# Patient Record
Sex: FEMALE | Race: ASIAN | Employment: UNEMPLOYED | URBAN - METROPOLITAN AREA
[De-identification: names, ages, dates, MRNs, and addresses within clinical notes are randomized per-mention and may not be internally consistent; named-entity substitution may affect disease eponyms.]

---

## 2020-07-29 ENCOUNTER — HOSPICE ADMISSION (OUTPATIENT)
Dept: HOSPICE | Facility: HOSPICE | Age: 85
End: 2020-07-29

## 2020-07-29 ENCOUNTER — HOSPITAL ENCOUNTER (EMERGENCY)
Age: 85
Discharge: HOME | End: 2020-07-29
Attending: EMERGENCY MEDICINE

## 2020-07-29 VITALS
HEART RATE: 76 BPM | WEIGHT: 72 LBS | BODY MASS INDEX: 13.59 KG/M2 | RESPIRATION RATE: 16 BRPM | SYSTOLIC BLOOD PRESSURE: 166 MMHG | DIASTOLIC BLOOD PRESSURE: 50 MMHG | OXYGEN SATURATION: 94 % | HEIGHT: 61 IN

## 2020-07-29 DIAGNOSIS — R62.7 FAILURE TO THRIVE IN ADULT: Primary | ICD-10-CM

## 2020-07-29 DIAGNOSIS — L89.200: ICD-10-CM

## 2020-07-29 DIAGNOSIS — F03.91 DEMENTIA WITH BEHAVIORAL DISTURBANCE, UNSPECIFIED DEMENTIA TYPE: ICD-10-CM

## 2020-07-29 PROCEDURE — 99285 EMERGENCY DEPT VISIT HI MDM: CPT

## 2020-07-29 PROCEDURE — 76450000000

## 2020-07-29 RX ORDER — SERTRALINE HYDROCHLORIDE 25 MG/1
25 TABLET, FILM COATED ORAL DAILY
COMMUNITY
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
## 2020-07-29 NOTE — ED NOTES
Telephone call from Cecily Monzon RN care [facility]. She has informed that patient can be d/c home and patients  is to call teresa when he gets home provided patient  with phone number to call.  would like to take her home. he does not want transportation arranged for this patient
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
## 2020-07-29 NOTE — ED NOTES
Pt's  states that he wants to take her home instead of having transport take her. We advised that he may need help getting her in the house and he reports that he is able to do this.

## 2021-09-01 NOTE — DISCHARGE INSTRUCTIONS
----- Message from Michael Vargas MD sent at 9/1/2021  3:05 PM EDT -----   Please inform the patient that the rheumatologist I referred her to is going on maternity leave  Please tell her to call the number on the referral and ask for Dr Kalin Carolina  ----- Message -----  From: Jerilynn Cooks, MD  Sent: 9/1/2021   1:48 PM EDT  To: Michael Vargas MD    [facility],    Thanks for reaching out  I will be going on maternity leave soon and won't be seeing new patients till [facility] 2022  If there is a rheumatology referral in her chart she will be scheduled with one of my colleagues  Thank you,    Aman Samuel    ----- Message -----  From: Michael Vargas MD  Sent: 9/1/2021   1:40 PM EDT  To: Jerilynn Cooks, MD    [facility] Aman Samuel,    I am sending you this patient who has severe arthritis and has been suffering from multiple joint pains fora few months now  She saw a rheumatologist but could not get much help  She has all blood work done and you can go over it  I will appreciate if you can see her at your earliest convenience        Thanks,    Michael Vargas Patient Education        Learning About Hospice and Palliative Care  What are hospice and palliative care? Palliative (say \"PAL-brenton-uh-tiv\") care is an area of medicine that helps give you more good days by providing care for quality-of-life issues. It includes treating symptoms like pain, nausea, or sleep problems. It can also include helping you and your loved ones to:  · Understand your illness better. · Talk more openly about your feelings. · Decide what treatments you want or don't want. · Communicate better with your doctors, nurses, and each other. Hospice care is a type of palliative care. But it's for people who are near the end of life. What kinds of care are involved? Palliative care: This treatment helps you feel better physically, emotionally, and spiritually while doctors also treat your illness. Your care may include pain relief, counseling, or nutrition advice. Hospice care: Again, the goal of this type of care is to help you feel better. And it can help you get the most out of the time you have left. But you no longer get treatment to try to cure your illness. When does care happen? Palliative care: This care can happen at any time during a serious illness. You don't have to be near death to get this care. Hospice care: In most cases, you can choose hospice care when your doctor believes that you have no more than about 6 months to live. Where does the care happen? Palliative care: This care often happens in hospitals or long-term care facilities like nursing homes. It can take place wherever you are treated, even in your home. Hospice care: Most hospice care is done in the place the patient calls \"home. \" This is often the person's home. But it could also be a place like a nursing home or snf center. Hospice care may also be given in hospice centers, hospitals, and other places. Who provides the care? Palliative care:  There are doctors and nurses who specialize in this field. But your own doctor may also give some of this care. And there are many other experts who may help you. These include social workers, counselors, therapists, and nutrition experts. Hospice care: In hospitals, hospice centers, and other facilities, care is given by doctors, nurses, and others who are trained in hospice care. In the home, a family member is often the main caregiver. But the family member gets help from care experts. They are on call 24 hours a day. Where can you learn more? Go to http://tre-daniel.info/  Enter D515 in the search box to learn more about \"Learning About Hospice and Palliative Care. \"  Current as of: December 9, 2019               Content Version: 12.5  © 7530-7816 [employer]. Care instructions adapted under license by [employer] (which disclaims liability or warranty for this information). If you have questions about a medical condition or this instruction, always ask your healthcare professional. [address] [address] any warranty or liability for your use of this information.